# Patient Record
Sex: FEMALE | Race: WHITE | NOT HISPANIC OR LATINO | Employment: FULL TIME | URBAN - METROPOLITAN AREA
[De-identification: names, ages, dates, MRNs, and addresses within clinical notes are randomized per-mention and may not be internally consistent; named-entity substitution may affect disease eponyms.]

---

## 2024-01-30 ENCOUNTER — OFFICE VISIT (OUTPATIENT)
Dept: PRIMARY CARE | Facility: CLINIC | Age: 29
End: 2024-01-30
Payer: COMMERCIAL

## 2024-01-30 VITALS
HEIGHT: 65 IN | HEART RATE: 61 BPM | DIASTOLIC BLOOD PRESSURE: 71 MMHG | BODY MASS INDEX: 22.82 KG/M2 | WEIGHT: 137 LBS | SYSTOLIC BLOOD PRESSURE: 129 MMHG

## 2024-01-30 DIAGNOSIS — D68.51 HETEROZYGOUS FACTOR V LEIDEN MUTATION (MULTI): ICD-10-CM

## 2024-01-30 DIAGNOSIS — Z12.4 CERVICAL CANCER SCREENING: Primary | ICD-10-CM

## 2024-01-30 DIAGNOSIS — Z00.00 ANNUAL PHYSICAL EXAM: ICD-10-CM

## 2024-01-30 DIAGNOSIS — Z97.5 IUD (INTRAUTERINE DEVICE) IN PLACE: ICD-10-CM

## 2024-01-30 DIAGNOSIS — R53.83 OTHER FATIGUE: ICD-10-CM

## 2024-01-30 PROBLEM — D22.61 MELANOCYTIC NEVI OF RIGHT UPPER LIMB, INCLUDING SHOULDER: Status: ACTIVE | Noted: 2023-06-26

## 2024-01-30 PROBLEM — D22.71 MELANOCYTIC NEVI OF RIGHT LOWER LIMB, INCLUDING HIP: Status: ACTIVE | Noted: 2023-06-26

## 2024-01-30 PROCEDURE — 90471 IMMUNIZATION ADMIN: CPT | Performed by: INTERNAL MEDICINE

## 2024-01-30 PROCEDURE — 87624 HPV HI-RISK TYP POOLED RSLT: CPT

## 2024-01-30 PROCEDURE — 90686 IIV4 VACC NO PRSV 0.5 ML IM: CPT | Performed by: INTERNAL MEDICINE

## 2024-01-30 PROCEDURE — 88141 CYTOPATH C/V INTERPRET: CPT | Performed by: PATHOLOGY

## 2024-01-30 PROCEDURE — 1036F TOBACCO NON-USER: CPT | Performed by: INTERNAL MEDICINE

## 2024-01-30 PROCEDURE — 88175 CYTOPATH C/V AUTO FLUID REDO: CPT

## 2024-01-30 PROCEDURE — 99395 PREV VISIT EST AGE 18-39: CPT | Performed by: INTERNAL MEDICINE

## 2024-01-30 ASSESSMENT — PROMIS GLOBAL HEALTH SCALE
EMOTIONAL_PROBLEMS: SOMETIMES
RATE_PHYSICAL_HEALTH: GOOD
RATE_AVERAGE_PAIN: 0
CARRYOUT_PHYSICAL_ACTIVITIES: COMPLETELY
RATE_GENERAL_HEALTH: VERY GOOD
RATE_QUALITY_OF_LIFE: VERY GOOD
RATE_SOCIAL_SATISFACTION: FAIR
RATE_MENTAL_HEALTH: GOOD
RATE_AVERAGE_FATIGUE: MILD
CARRYOUT_SOCIAL_ACTIVITIES: GOOD

## 2024-01-30 ASSESSMENT — PAIN SCALES - GENERAL: PAINLEVEL: 0-NO PAIN

## 2024-01-30 NOTE — PROGRESS NOTES
Subjective   Patient ID:   [id number]   [id number]   Alvina Escobedo is a 29 y.o. female who presents for for annual exam.  No chief complaint on file..  HPI  29 year old female here for annual exam.  She is taking her bar exam in a few weeks.  She is working as an .  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  She has a dog and walks the dog daily.  She has a nice community to support.  Her friend Arabella in [place] whom she went to [place] with is her closest friend.      ROS were reviewed and are negative with the exception of what is noted in HPI    There were no vitals taken for this visit.  Objective   Physical Exam  Constitutional:       General: She is not in acute distress.  HENT:      Head: Normocephalic and atraumatic.      Right Ear: There is no impacted cerumen.      Left Ear: There is no impacted cerumen.      Mouth/Throat:      Mouth: Mucous membranes are moist.   Eyes:      Extraocular Movements: Extraocular movements intact.      Pupils: Pupils are equal, round, and reactive to light.   Neck:      Comments: Thyroid mobile  Cardiovascular:      Rate and Rhythm: Normal rate and regular rhythm.      Heart sounds: No murmur heard.     No friction rub. No gallop.   Pulmonary:      Effort: Pulmonary effort is normal.      Breath sounds: No wheezing, rhonchi or rales.   Abdominal:      Palpations: Abdomen is soft.      Tenderness: There is no abdominal tenderness.   Musculoskeletal:         General: No swelling.      Cervical back: Neck supple.   Lymphadenopathy:      Cervical: No cervical adenopathy.   Neurological:      Mental Status: She is alert.     Breast:  no masses, no discharge  Pelvic:  nl external genitalia,  no CTM, cervix well visualized with Mirena string seen,  no adnexal full or tender    Assessment/Plan   Problem List Items Addressed This Visit    None  Provider Impressions     1. Factor V leiden heterozygous - noted to be cautious  with long trips.    2. Stress - work, personal she has started to talk to therapist at [facility] and thinks she helps her resolve issues more quickly although already quite introspective.  Has great grasp of her situation and her strengths and stressors.  The bar exam is the big stress for her at this juncture. Well aware that physical activity helps her mental functioning.    3.Fatigue - she feels like this is just her norm.   Able to nap every afternoon for at least 20 minutes. TSH was normal last year and no real change experienced.    4. Family history of colon CA (grandma), (grandpa), bipolar (grandma), lipids (parents), Parkinsons (m. Grandpa)  5. Health maintenance   - PAP 2018, repeat today 1/24.    - 2018 Mirena placement per GYN,    -Screening lipids great in 2023   - healthy lifestyle, encourage exercise       - Immunizations:  flu vaccine today, will get COVID shot       Need copy of old immunization records to clarify others up to date but mom is doctor so watches as well.     Teresa Avendano MD

## 2024-01-30 NOTE — PATIENT INSTRUCTIONS
1. Good to see you today  2. You look amazing today, healthy with normal heart and lung.    3. Your weight is stable actually down 4 pounds  4. Labs last year were great and no need to repeat today  5, Your PAP smear was done today and you will need the Mirena out in 2025 or 26  6. Best of luck on the exam.  Hope you have a chance to show how much you know  7. You are awesome and glad you are getting some support to make sure that you recognize all your strengths.  8.  See you in a year unless you need me sooner of course always here for you  9.  Flu shot today. COVID shot at pharmacy.     7

## 2024-01-30 NOTE — LETTER
May 10, 2024     Alvina Escobedo  [address]  [facility] [address]      Dear Ms. Escobedo:    Below are the results from your recent visit:    Resulted Orders   THINPREP PAP   Result Value Ref Range    Case Report       Gynecologic Cytology                              Case: [id number]                                   Authorizing Provider:  Teresa Avendano MD        Collected:           01/30/2024 1021              Ordering Location:     [facility]  Received:            01/30/2024 1021                                     Medicine                                                                     First Screen:          GREGORIA Wetzel                                                   Pathologist:           Jose Chandler MD                                                         Specimen:    ThinPrep Liquid-Based Pap-Imaging System Screen, CERVIX, SCREENING                         Final Cytological Interpretation           A. THINPREP PAP CERVIX, SCREENING -     Specimen Adequacy  Satisfactory for evaluation; endocervical/transformation zone component is present    General Categorization  Negative for intraepithelial lesion or malignancy.    Descriptive Interpretation  Negative for intraepithelial lesion or malignancy  Reactive cellular changes associated with inflammation                    Slide(s) initially screened by GREGORIA Wetzel at [address] [address]  By the signature on this report, the individual or group listed as making the Final Interpretation/Diagnosis certifies that they have reviewed this case.       ThinPrep Imaging System       This specimen has been analyzed by the ThinPrep Imaging System ([employer] an automated imaging and review system, which assists the laboratory in evaluating cells on ThinPrep Pap tests. Following automated imaging, selected fields from every slide were reviewed by a  cytotechnologist and/or pathologist.        Educational Note       Cervical cytology is a screening procedure primarily for squamous cancers and precursors and has associated false-negative and false-positives results as evidenced by published data. Your patient's test should be interpreted in this context, together with the patient's history and clinical findings. Regular sampling and follow-up of unexplained clinical signs and symptoms are recommended to minimize false negative results.      Perform HPV HR test? Always (all interpretations)     Include HPV Genotype? Yes     LMP        Comment:      none on Mirena      Contraceptive History       IUD[Mirena     HPV DNA High Risk With Genotype   Result Value Ref Range    HPV, high-risk Negative Negative    HPV Type 16 DNA Negative Negative    HPV Type 18 DNA Negative Negative    HPV non-Type 16 or 18 DNA Negative Negative    Narrative     Testing for high-risk (HR) types of human papilloma virus (HPV) is performed by the Roche ken HPV Test. The ken HPV Test is a qualitative polymerase chain reaction that amplifies DNA of HPV16, HPV18, and 12 other high-risk HPV types (31, 33, 35, 39, 45, 51, 52, 56, 58, 59, 66, and 68) associated with cervical cancer and its precursor lesions. A positive result indicates the presence of HPV DNA due to one or more of the 14 genotypes: 16, 18, 31, 33, 35, 39, 45, 51, 52, 56, 58, 59, 66, and 68. Negative results indicates HPV DNA concentrations are undectectable or below the pre-set threshold for detection. False negative results may be associated with unoptimized sampling. A negative HR HPV result does not exclude the possibility of future cytologic HSIL or underlying CIN2-3 or cancer.   This test is approved by the US Food and Drug Administration. Results of this test should be interpreted in conjunction with the patient Pap test results. Please refer to ASCCP current quidelines for the use of HPV DNA testing, result  interpretation, and patient management.   The performance of this test was verified by the [facility] at [facility]. The lab is certified under the Clinical Laboratory Amendments of 1988 (CLIA 88) as qualified to perform high complexity clinical laboratory testing.    PERFORMING LAB LOCATIONS  [facility]: [address] [place] [address] [place] [address]       The test results show that your current treatment is working. Please continue your current medication and plan. We recommend that you repeat the above test(s) in 5 years.    If you have any questions or concerns, please don't hesitate to call.         Sincerely,        Teresa Avendano MD

## 2024-02-09 LAB
CYTOLOGY CMNT CVX/VAG CYTO-IMP: NORMAL
HPV HR 12 DNA GENITAL QL NAA+PROBE: NEGATIVE
HPV HR GENOTYPES PNL CVX NAA+PROBE: NEGATIVE
HPV16 DNA SPEC QL NAA+PROBE: NEGATIVE
HPV18 DNA SPEC QL NAA+PROBE: NEGATIVE
LAB AP CONTRACEPTIVE HISTORY: NORMAL
LAB AP HPV GENOTYPE QUESTION: YES
LAB AP HPV HR: NORMAL
LABORATORY COMMENT REPORT: NORMAL
LMP START DATE: NORMAL
PATH REPORT.TOTAL CANCER: NORMAL

## 2025-02-04 ENCOUNTER — APPOINTMENT (OUTPATIENT)
Dept: PRIMARY CARE | Facility: CLINIC | Age: 30
End: 2025-02-04
Payer: COMMERCIAL

## 2025-02-04 VITALS
HEART RATE: 62 BPM | WEIGHT: 139 LBS | HEIGHT: 65 IN | BODY MASS INDEX: 23.16 KG/M2 | SYSTOLIC BLOOD PRESSURE: 107 MMHG | DIASTOLIC BLOOD PRESSURE: 72 MMHG

## 2025-02-04 DIAGNOSIS — D68.51 HETEROZYGOUS FACTOR V LEIDEN MUTATION (MULTI): ICD-10-CM

## 2025-02-04 DIAGNOSIS — Z00.00 ANNUAL PHYSICAL EXAM: Primary | ICD-10-CM

## 2025-02-04 PROCEDURE — 99395 PREV VISIT EST AGE 18-39: CPT | Performed by: INTERNAL MEDICINE

## 2025-02-04 PROCEDURE — 3008F BODY MASS INDEX DOCD: CPT | Performed by: INTERNAL MEDICINE

## 2025-02-04 ASSESSMENT — PAIN SCALES - GENERAL: PAINLEVEL_OUTOF10: 0-NO PAIN

## 2025-02-04 NOTE — PROGRESS NOTES
Subjective   Patient ID:   [id number]   [id number]   Alvina Escobedo is a 30 y.o. female who presents for No chief complaint on file..  HPI    30 year old female here for annual exam.  She is busy at work.  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   She walks her dog regularly and rockclimbs at [facility].  She has connected with a strong friend group from high school.  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  She also started [employer] dancing.       ROS were reviewed and are negative with the exception of what is noted in HPI    There were no vitals taken for this visit.  Objective   Physical Exam  Vitals reviewed.   Constitutional:       General: She is not in acute distress.  HENT:      Head: Normocephalic and atraumatic.      Right Ear: Tympanic membrane normal.      Left Ear: Tympanic membrane normal.      Mouth/Throat:      Mouth: Mucous membranes are moist.   Eyes:      Extraocular Movements: Extraocular movements intact.      Pupils: Pupils are equal, round, and reactive to light.   Cardiovascular:      Rate and Rhythm: Normal rate and regular rhythm.      Heart sounds: No murmur heard.     No friction rub. No gallop.   Pulmonary:      Effort: Pulmonary effort is normal.      Breath sounds: No wheezing, rhonchi or rales.   Abdominal:      Palpations: Abdomen is soft.      Tenderness: There is no abdominal tenderness. There is no guarding.   Musculoskeletal:         General: No swelling.      Cervical back: Neck supple.   Lymphadenopathy:      Cervical: No cervical adenopathy.   Neurological:      Mental Status: She is alert.         Assessment/Plan     #. Factor V leiden heterozygous - she is aware of associated risks.    #. Stress - she is in a much better place and doing great.    #. Fatigue - not debilitating, she feels like this is related to her busy lifestyle.  TSH was normal last year.    #. Family history of colon CA (grandma), (grandpa), bipolar (grandma),  lipids (parents), Parkinsons (m. Grandpa)  #. Health maintenance   - PAP 2018, repeat 2024.  Next 2029  - 2018 Mirena placement per GYN, she knows should come out around 2026.     -Screening lipids great in 2023.  Has great habits so do not feel need to do labs this year.     - healthy lifestyle, great at exercise, has good friend group and amazing family        - Immunizations:  up to date      Teresa Avendano MD

## 2025-02-04 NOTE — PATIENT INSTRUCTIONS
Great to see you today  You look awesome  Congratulations on passing the bar exam  Ordered the lipid panel and a hemoglobin A1C at your convencience  PAP was 2024 so next one 2029  IUD needs to come out by 2026  Thanks for exercising and staying so fit.    It has been a privilege and a pleasure to be your doctor.  Wishing you good health.    Dr. Frederick Joseph would be my recommendation for you

## 2025-02-10 ENCOUNTER — PATIENT MESSAGE (OUTPATIENT)
Dept: OBSTETRICS AND GYNECOLOGY | Facility: CLINIC | Age: 30
End: 2025-02-10
Payer: COMMERCIAL

## 2025-02-10 DIAGNOSIS — G89.18 PAIN ASSOCIATED WITH SURGICAL PROCEDURE: Primary | ICD-10-CM

## 2025-02-12 RX ORDER — KETOROLAC TROMETHAMINE 10 MG/1
10 TABLET, FILM COATED ORAL EVERY 6 HOURS PRN
Qty: 20 TABLET | Refills: 0 | Status: SHIPPED | OUTPATIENT
Start: 2025-02-12 | End: 2025-02-17

## 2025-02-21 ENCOUNTER — APPOINTMENT (OUTPATIENT)
Dept: OBSTETRICS AND GYNECOLOGY | Facility: CLINIC | Age: 30
End: 2025-02-21
Payer: COMMERCIAL

## 2025-02-21 VITALS
WEIGHT: 143 LBS | SYSTOLIC BLOOD PRESSURE: 102 MMHG | BODY MASS INDEX: 23.82 KG/M2 | HEIGHT: 65 IN | DIASTOLIC BLOOD PRESSURE: 60 MMHG

## 2025-02-21 DIAGNOSIS — N76.0 ACUTE VAGINITIS: ICD-10-CM

## 2025-02-21 DIAGNOSIS — Z30.433 ENCOUNTER FOR REMOVAL AND REINSERTION OF INTRAUTERINE CONTRACEPTIVE DEVICE (IUD): Primary | ICD-10-CM

## 2025-02-21 LAB — PREGNANCY TEST URINE, POC: NEGATIVE

## 2025-02-21 ASSESSMENT — PAIN SCALES - GENERAL: PAINLEVEL_OUTOF10: 0-NO PAIN

## 2025-02-21 ASSESSMENT — PATIENT HEALTH QUESTIONNAIRE - PHQ9
SUM OF ALL RESPONSES TO PHQ9 QUESTIONS 1 AND 2: 0
2. FEELING DOWN, DEPRESSED OR HOPELESS: NOT AT ALL
1. LITTLE INTEREST OR PLEASURE IN DOING THINGS: NOT AT ALL

## 2025-02-21 NOTE — PROGRESS NOTES
Patient ID: Alvina Escobedo is a 30 y.o. female.    IUD REMOVAL AND REINSERTION  IUD Insertion    Performed by: Candie Perera MD  Authorized by: Candie Perera MD    Procedure: IUD removal and insertion    Consent obtained by patient, parent, or legal power of  - including discussion of procedure risks and benefits, patient questions answered, and patient education provided: yes    Other reason for removal:  IUD  for indication of AUB  Strings visualized: yes    Cervix cleaned with: iodopovidone    IUD grasped by forceps: yes    IUD removed: yes    Date/Time of Removal:  2025 4:11 PM  Removed without complications: yes    IUD intact: yes    Pregnancy risk: reasonably certain the patient is not pregnant    Date/Time of Insertion:  2025 4:11 PM  Immediately prior to procedure a time out was called: yes    Pelvic exam performed: yes    Speculum placed in vagina: yes    Cervix cleaned and prepped: yes    Tenaculum/Allis/Ring Forceps applied to cervix: yes    Anesthesia used: no    Uterus sound depth (cm):  8  IUD inserted without complications: yes    OSM: 52 mg levonorgestrel 20 mcg/24hr  Strings trimmed to (cm):  3  Patient tolerated procedure well: yes    Inserted with ultrasound guidance: no    Transvaginal sono confirmed fundal placement: fundal placement confirmed by transabdominal sono.    Estimated blood loss (mL):  3  Intended removal date: 8 years      Follow up as needed or for well care    Candie Perera MD

## 2025-02-22 LAB — BV SCORE VAG QL: NORMAL

## 2026-02-05 ENCOUNTER — APPOINTMENT (OUTPATIENT)
Dept: PRIMARY CARE | Facility: CLINIC | Age: 31
End: 2026-02-05
Payer: COMMERCIAL